# Patient Record
Sex: FEMALE | Race: WHITE | NOT HISPANIC OR LATINO | Employment: OTHER | URBAN - METROPOLITAN AREA
[De-identification: names, ages, dates, MRNs, and addresses within clinical notes are randomized per-mention and may not be internally consistent; named-entity substitution may affect disease eponyms.]

---

## 2020-12-31 ENCOUNTER — TELEMEDICINE (OUTPATIENT)
Dept: GASTROENTEROLOGY | Facility: CLINIC | Age: 75
End: 2020-12-31
Payer: MEDICARE

## 2020-12-31 VITALS — WEIGHT: 160 LBS | HEIGHT: 66 IN | BODY MASS INDEX: 25.71 KG/M2

## 2020-12-31 DIAGNOSIS — M35.01 SJOGREN'S SYNDROME WITH KERATOCONJUNCTIVITIS SICCA (HCC): ICD-10-CM

## 2020-12-31 DIAGNOSIS — K56.41 FECAL IMPACTION (HCC): ICD-10-CM

## 2020-12-31 DIAGNOSIS — F41.9 ANXIETY: ICD-10-CM

## 2020-12-31 DIAGNOSIS — K59.00 CONSTIPATION, UNSPECIFIED CONSTIPATION TYPE: Primary | ICD-10-CM

## 2020-12-31 PROBLEM — I10 ESSENTIAL HYPERTENSION: Status: ACTIVE | Noted: 2020-12-31

## 2020-12-31 PROCEDURE — 99204 OFFICE O/P NEW MOD 45 MIN: CPT | Performed by: INTERNAL MEDICINE

## 2020-12-31 RX ORDER — LISINOPRIL 40 MG/1
40 TABLET ORAL DAILY
COMMUNITY
Start: 2020-12-21

## 2020-12-31 RX ORDER — AMLODIPINE BESYLATE 5 MG/1
5 TABLET ORAL DAILY
COMMUNITY
Start: 2020-12-19

## 2020-12-31 RX ORDER — HYDROXYCHLOROQUINE SULFATE 200 MG/1
200 TABLET, FILM COATED ORAL DAILY
COMMUNITY
Start: 2020-11-21

## 2020-12-31 RX ORDER — LANOLIN ALCOHOL/MO/W.PET/CERES
CREAM (GRAM) TOPICAL DAILY
COMMUNITY

## 2020-12-31 RX ORDER — DIAZEPAM 5 MG/1
5 TABLET ORAL EVERY 6 HOURS PRN
COMMUNITY
Start: 2020-11-16

## 2020-12-31 RX ORDER — MELATONIN
1000 DAILY
COMMUNITY

## 2021-01-06 ENCOUNTER — TELEPHONE (OUTPATIENT)
Dept: GASTROENTEROLOGY | Facility: CLINIC | Age: 76
End: 2021-01-06

## 2021-01-06 NOTE — TELEPHONE ENCOUNTER
Called patient to schedule colonoscopy ordered at recent telemed visit  She was driving at the moment and will call me back when she gets home to her calendar

## 2021-01-27 ENCOUNTER — TELEMEDICINE (OUTPATIENT)
Dept: GASTROENTEROLOGY | Facility: CLINIC | Age: 76
End: 2021-01-27

## 2021-01-27 VITALS — WEIGHT: 160 LBS | HEIGHT: 66 IN | BODY MASS INDEX: 25.71 KG/M2

## 2021-01-27 DIAGNOSIS — K59.00 CONSTIPATION, UNSPECIFIED CONSTIPATION TYPE: Primary | ICD-10-CM

## 2021-01-27 RX ORDER — CHOLESTYRAMINE 4 G/9G
1 POWDER, FOR SUSPENSION ORAL DAILY
COMMUNITY
End: 2021-11-18

## 2021-01-27 NOTE — PROGRESS NOTES
Why does your doctor want you to have this procedure? Constipation    Do you have kidney disease?  no  If yes, are you on dialysis :     Have you had diverticulitis within the past 2 months? no    Are you diabetic?  no  If yes, insulin dependent: If yes, provide diabetic instructions sheet     Do take iron supplements?  no  If yes, instruct patient to hold iron supplement for 7 days prior    Are you on a blood thinner? no   Was the blood thinner sheet complete and faxed to cardiologist no  Plavix (clopidogrel), Coumadin (warfarin), Lovenox (enoxaparin), Xarelto (rivaroxaban), Pradaxa(dabigatran), Eliquis(apixaban) Savaysa/Lixiana (edoxapan)    Do you have an automatic implantable cardiac defibrillator (AICD)/pacemaker ([facility])? no  Was AICD/pacemaker sheet completed and faxed to cardiologist? no    Are you on home oxygen? no  If yes, continuous or nocturnal:     Have you been treated for MRSA, VRE or any communicable diseases? no    Heart attack, stroke, or stent within 3 months? no  Schedule at Hospital if within 3-6 months   Use nitroglycerin for chest pain in the last 6 months? no    History of organ  transplant?  no   If yes, notify Endo      History of neck/throat/tongue surgery or cancer? no  IF yes, notify Endo      Any problems with anesthesia in the past? no     Was stool C diff ordered?  no Stool specimen needs to be completed prior to procedure    Do have any facial or body piercings?no     Do you have a latex allergy? no     Do have an allergy to metals? (Bravo study only) no     If pediatric patient, was consent faxed to pediatrician no     Patient rights reviewed yes     Colon phone prep completed;  suprep instructions reviewed and emailed to pt; pts' constipation has improved; moves bowels almost daily; having softer stools more regularly; rx forwarded to provider

## 2021-02-03 ENCOUNTER — ANESTHESIA EVENT (OUTPATIENT)
Dept: GASTROENTEROLOGY | Facility: AMBULATORY SURGERY CENTER | Age: 76
End: 2021-02-03

## 2021-02-03 ENCOUNTER — HOSPITAL ENCOUNTER (OUTPATIENT)
Dept: GASTROENTEROLOGY | Facility: AMBULATORY SURGERY CENTER | Age: 76
Discharge: HOME | End: 2021-02-03
Payer: MEDICARE

## 2021-02-03 ENCOUNTER — ANESTHESIA (OUTPATIENT)
Dept: GASTROENTEROLOGY | Facility: AMBULATORY SURGERY CENTER | Age: 76
End: 2021-02-03

## 2021-02-03 VITALS
DIASTOLIC BLOOD PRESSURE: 80 MMHG | HEART RATE: 58 BPM | SYSTOLIC BLOOD PRESSURE: 170 MMHG | OXYGEN SATURATION: 99 % | TEMPERATURE: 97.7 F | RESPIRATION RATE: 20 BRPM

## 2021-02-03 VITALS — HEART RATE: 49 BPM

## 2021-02-03 DIAGNOSIS — K56.41 FECAL IMPACTION (HCC): ICD-10-CM

## 2021-02-03 DIAGNOSIS — K59.00 CONSTIPATION, UNSPECIFIED CONSTIPATION TYPE: ICD-10-CM

## 2021-02-03 PROCEDURE — 45378 DIAGNOSTIC COLONOSCOPY: CPT | Performed by: INTERNAL MEDICINE

## 2021-02-03 RX ORDER — GLYCOPYRROLATE 0.2 MG/ML
INJECTION INTRAMUSCULAR; INTRAVENOUS AS NEEDED
Status: DISCONTINUED | OUTPATIENT
Start: 2021-02-03 | End: 2021-02-03

## 2021-02-03 RX ORDER — PROPOFOL 10 MG/ML
INJECTION, EMULSION INTRAVENOUS AS NEEDED
Status: DISCONTINUED | OUTPATIENT
Start: 2021-02-03 | End: 2021-02-03

## 2021-02-03 RX ORDER — SODIUM CHLORIDE 9 MG/ML
50 INJECTION, SOLUTION INTRAVENOUS CONTINUOUS
Status: DISCONTINUED | OUTPATIENT
Start: 2021-02-03 | End: 2021-02-07 | Stop reason: HOSPADM

## 2021-02-03 RX ORDER — ONDANSETRON 2 MG/ML
INJECTION INTRAMUSCULAR; INTRAVENOUS AS NEEDED
Status: DISCONTINUED | OUTPATIENT
Start: 2021-02-03 | End: 2021-02-03

## 2021-02-03 RX ADMIN — PROPOFOL 20 MG: 10 INJECTION, EMULSION INTRAVENOUS at 11:38

## 2021-02-03 RX ADMIN — GLYCOPYRROLATE 0.2 MG: 0.2 INJECTION INTRAMUSCULAR; INTRAVENOUS at 11:34

## 2021-02-03 RX ADMIN — PROPOFOL 90 MG: 10 INJECTION, EMULSION INTRAVENOUS at 11:25

## 2021-02-03 RX ADMIN — ONDANSETRON 4 MG: 2 INJECTION INTRAMUSCULAR; INTRAVENOUS at 11:16

## 2021-02-03 RX ADMIN — PROPOFOL 50 MG: 10 INJECTION, EMULSION INTRAVENOUS at 11:28

## 2021-02-03 RX ADMIN — PROPOFOL 40 MG: 10 INJECTION, EMULSION INTRAVENOUS at 11:31

## 2021-02-03 RX ADMIN — PROPOFOL 50 MG: 10 INJECTION, EMULSION INTRAVENOUS at 11:41

## 2021-02-03 RX ADMIN — SODIUM CHLORIDE: 9 INJECTION, SOLUTION INTRAVENOUS at 11:11

## 2021-02-03 RX ADMIN — SODIUM CHLORIDE 50 ML/HR: 9 INJECTION, SOLUTION INTRAVENOUS at 11:14

## 2021-02-03 NOTE — DISCHARGE INSTRUCTIONS
Hemorrhoids   WHAT YOU NEED TO KNOW:   What are hemorrhoids? Hemorrhoids are swollen blood vessels inside your rectum (internal hemorrhoids) or on your anus (external hemorrhoids)  Sometimes a hemorrhoid may prolapse  This means it extends out of your anus  What increases my risk for hemorrhoids? · Pregnancy or obesity    · Straining or sitting for a long time during bowel movements    · Liver disease    · Weak muscles around the anus caused by older age, rectal surgery, or anal intercourse    · A lack of physical activity    · Chronic diarrhea or constipation    · A low-fiber diet    What are the signs and symptoms of hemorrhoids? · Pain or itching around your anus or inside your rectum    · Swelling or bumps around your anus    · Bright red blood in your bowel movement, on the toilet paper, or in the toilet bowl    · Tissue bulging out of your anus (prolapsed hemorrhoids)    · Incontinence (poor control over urine or bowel movements)    How are hemorrhoids diagnosed? Your healthcare provider will ask about your symptoms, the foods you eat, and your bowel movements  He or she will examine your anus for external hemorrhoids  You may need the following:  · A digital rectal exam  is a test to check for hemorrhoids  Your healthcare provider will put a gloved finger inside your anus to feel for the hemorrhoids  · An anoscopy  is a test that uses a scope (small tube with a light and camera on the end) to look at your hemorrhoids  How are hemorrhoids treated? Treatment will depend on your symptoms  You may need any of the following:  · Medicines  can help decrease pain and swelling, and soften your bowel movement  The medicine may be a pill, pad, cream, or ointment  · Procedures  may be used to shrink or remove your hemorrhoid  Examples include rubber-band ligation, sclerotherapy, and photocoagulation  These procedures may be done in your healthcare provider's office   Ask your healthcare provider for more information about these procedures  · Surgery  may be needed to shrink or remove your hemorrhoids  How can I manage my symptoms? · Apply ice on your anus for 15 to 20 minutes every hour or as directed  Use an ice pack, or put crushed ice in a plastic bag  Cover it with a towel before you apply it to your anus  Ice helps prevent tissue damage and decreases swelling and pain  · Take a sitz bath  Fill a bathtub with 4 to 6 inches of warm water  You may also use a sitz bath pan that fits inside a toilet bowl  Sit in the sitz bath for 15 minutes  Do this 3 times a day, and after each bowel movement  The warm water can help decrease pain and swelling  · Keep your anal area clean  Gently wash the area with warm water daily  Soap may irritate the area  After a bowel movement, wipe with moist towelettes or wet toilet paper  Dry toilet paper can irritate the area  How can I help prevent hemorrhoids? · Do not strain to have a bowel movement  Do not sit on the toilet too long  These actions can increase pressure on the tissues in your rectum and anus  · Drink plenty of liquids  Liquids can help prevent constipation  Ask how much liquid to drink each day and which liquids are best for you  · Eat a variety of high-fiber foods  Examples include fruits, vegetables, and whole grains  Ask your healthcare provider how much fiber you need each day  You may need to take a fiber supplement  · Exercise as directed  Exercise, such as walking, may make it easier to have a bowel movement  Ask your healthcare provider to help you create an exercise plan  · Do not have anal sex  Anal sex can weaken the skin around your rectum and anus  · Avoid heavy lifting  This can cause straining and increase your risk for another hemorrhoid  When should I seek immediate care? · You have severe pain in your rectum or around your anus      · You have severe pain in your abdomen and you are vomiting  · You have bleeding from your anus that soaks through your underwear  When should I contact my healthcare provider? · You have frequent and painful bowel movements  · Your hemorrhoid looks or feels more swollen than usual      · You do not have a bowel movement for 2 days or more  · You see or feel tissue coming through your anus  · You have questions or concerns about your condition or care  CARE AGREEMENT:   You have the right to help plan your care  Learn about your health condition and how it may be treated  Discuss treatment options with your healthcare providers to decide what care you want to receive  You always have the right to refuse treatment  The above information is an  only  It is not intended as medical advice for individual conditions or treatments  Talk to your doctor, nurse or pharmacist before following any medical regimen to see if it is safe and effective for you  © Copyright [address] Information is for End User's use only and may not be sold, redistributed or otherwise used for commercial purposes   All illustrations and images included in CareNotes® are the copyrighted property of A D A M , Inc  or [address] [place] [address]

## 2021-02-03 NOTE — H&P
History and Physical -  [facility]  Whitney Gardiner 70 y o  female MRN: [id number]    HPI: Whitney Gardiner is a 70y o  year old female who presents for diagnostic colonoscopy  Patient has had a recent alteration in her bowel habit    REVIEW OF SYSTEMS: Per the HPI, and otherwise unremarkable  Historical Information   Past Medical History:   Diagnosis Date    [facility]     Hypertension     Sjogren's disease [facility]     UTI (urinary tract infection)      Past Surgical History:   Procedure Laterality Date    BREAST LUMPECTOMY      CHOLECYSTECTOMY      REPLACEMENT TOTAL KNEE       Social History   Social History     Substance and Sexual Activity   Alcohol Use Not Currently     Social History     Substance and Sexual Activity   Drug Use Never     Social History     Tobacco Use   Smoking Status Never Smoker   Smokeless Tobacco Never Used     Family History   Problem Relation Age of Onset    COPD Mother     Depression Mother     Stroke Father     Ovarian cancer Sister     Colon cancer Neg Hx     Colon polyps Neg Hx        Meds/Allergies       Current Outpatient Medications:     amLODIPine (NORVASC) 5 mg tablet    Calcium Carbonate-Vit D-Min (CALCIUM 1200 PO)    cholecalciferol (VITAMIN D3) 1,000 units tablet    cholestyramine (Questran) 4 g packet    diazepam (VALIUM) 5 mg tablet    hydroxychloroquine (PLAQUENIL) 200 mg tablet    lisinopril (ZESTRIL) 20 mg tablet    Na Sulfate-K Sulfate-Mg Sulf 17 5-3 13-1 6 GM/177ML SOLN    vitamin B-12 (VITAMIN B-12) 1,000 mcg tablet    Allergies   Allergen Reactions    Penicillins        Objective     There were no vitals taken for this visit  PHYSICAL EXAM    Gen: NAD AAOx3  CV: S1S2 RRR no m/r/g  CHEST: Clear b/l no c/r/w  ABD: soft, +BS NT/ND  EXT: no edema    ASSESSMENT/PLAN:  This is a 70y o  year old female here for diagnostic colonoscopy, and she is stable and optimized for her procedure

## 2021-03-30 DIAGNOSIS — Z23 ENCOUNTER FOR IMMUNIZATION: ICD-10-CM

## 2021-11-18 ENCOUNTER — OFFICE VISIT (OUTPATIENT)
Dept: GASTROENTEROLOGY | Facility: CLINIC | Age: 76
End: 2021-11-18
Payer: MEDICARE

## 2021-11-18 VITALS
HEIGHT: 66 IN | BODY MASS INDEX: 26.2 KG/M2 | WEIGHT: 163 LBS | HEART RATE: 64 BPM | SYSTOLIC BLOOD PRESSURE: 138 MMHG | DIASTOLIC BLOOD PRESSURE: 86 MMHG

## 2021-11-18 DIAGNOSIS — R13.19 ESOPHAGEAL DYSPHAGIA: Primary | ICD-10-CM

## 2021-11-18 DIAGNOSIS — M35.01 SJOGREN'S SYNDROME WITH KERATOCONJUNCTIVITIS SICCA (HCC): ICD-10-CM

## 2021-11-18 DIAGNOSIS — Z12.11 COLON CANCER SCREENING: ICD-10-CM

## 2021-11-18 DIAGNOSIS — K59.09 OTHER CONSTIPATION: ICD-10-CM

## 2021-11-18 PROCEDURE — 99214 OFFICE O/P EST MOD 30 MIN: CPT | Performed by: INTERNAL MEDICINE

## 2021-11-18 PROCEDURE — 1123F ACP DISCUSS/DSCN MKR DOCD: CPT | Performed by: INTERNAL MEDICINE

## 2021-12-21 ENCOUNTER — APPOINTMENT (EMERGENCY)
Dept: CT IMAGING | Facility: HOSPITAL | Age: 76
End: 2021-12-21
Payer: MEDICARE

## 2021-12-21 ENCOUNTER — ANESTHESIA EVENT (OUTPATIENT)
Dept: GASTROENTEROLOGY | Facility: AMBULATORY SURGERY CENTER | Age: 76
End: 2021-12-21

## 2021-12-21 ENCOUNTER — HOSPITAL ENCOUNTER (OUTPATIENT)
Dept: GASTROENTEROLOGY | Facility: AMBULATORY SURGERY CENTER | Age: 76
Discharge: HOME | End: 2021-12-21
Payer: MEDICARE

## 2021-12-21 ENCOUNTER — ANESTHESIA (OUTPATIENT)
Dept: GASTROENTEROLOGY | Facility: AMBULATORY SURGERY CENTER | Age: 76
End: 2021-12-21

## 2021-12-21 ENCOUNTER — HOSPITAL ENCOUNTER (EMERGENCY)
Facility: HOSPITAL | Age: 76
Discharge: HOME | End: 2021-12-21
Attending: EMERGENCY MEDICINE | Admitting: EMERGENCY MEDICINE
Payer: MEDICARE

## 2021-12-21 VITALS
OXYGEN SATURATION: 99 % | RESPIRATION RATE: 16 BRPM | TEMPERATURE: 97.2 F | BODY MASS INDEX: 26.7 KG/M2 | SYSTOLIC BLOOD PRESSURE: 194 MMHG | HEART RATE: 56 BPM | WEIGHT: 162.92 LBS | DIASTOLIC BLOOD PRESSURE: 78 MMHG

## 2021-12-21 VITALS
TEMPERATURE: 97.4 F | DIASTOLIC BLOOD PRESSURE: 88 MMHG | OXYGEN SATURATION: 99 % | SYSTOLIC BLOOD PRESSURE: 119 MMHG | HEART RATE: 71 BPM | RESPIRATION RATE: 21 BRPM

## 2021-12-21 DIAGNOSIS — R13.19 ESOPHAGEAL DYSPHAGIA: ICD-10-CM

## 2021-12-21 DIAGNOSIS — R13.10 DYSPHAGIA: Primary | ICD-10-CM

## 2021-12-21 PROBLEM — I35.0 AORTIC STENOSIS: Status: ACTIVE | Noted: 2021-12-21

## 2021-12-21 LAB
ALBUMIN SERPL BCP-MCNC: 4.1 G/DL (ref 3.5–5)
ALP SERPL-CCNC: 71 U/L (ref 46–116)
ALT SERPL W P-5'-P-CCNC: 17 U/L (ref 12–78)
ANION GAP SERPL CALCULATED.3IONS-SCNC: 9 MMOL/L (ref 4–13)
AST SERPL W P-5'-P-CCNC: 20 U/L (ref 5–45)
BILIRUB SERPL-MCNC: 1.1 MG/DL (ref 0.2–1)
BUN SERPL-MCNC: 18 MG/DL (ref 5–25)
CALCIUM SERPL-MCNC: 8.9 MG/DL (ref 8.3–10.1)
CHLORIDE SERPL-SCNC: 106 MMOL/L (ref 100–108)
CO2 SERPL-SCNC: 25 MMOL/L (ref 21–32)
CREAT SERPL-MCNC: 0.84 MG/DL (ref 0.6–1.3)
GFR SERPL CREATININE-BSD FRML MDRD: 68 ML/MIN/1.73SQ M
GLUCOSE SERPL-MCNC: 92 MG/DL (ref 65–140)
POTASSIUM SERPL-SCNC: 4 MMOL/L (ref 3.5–5.3)
PROT SERPL-MCNC: 7.3 G/DL (ref 6.4–8.2)
SODIUM SERPL-SCNC: 140 MMOL/L (ref 136–145)

## 2021-12-21 PROCEDURE — 96360 HYDRATION IV INFUSION INIT: CPT

## 2021-12-21 PROCEDURE — 99283 EMERGENCY DEPT VISIT LOW MDM: CPT

## 2021-12-21 PROCEDURE — 43239 EGD BIOPSY SINGLE/MULTIPLE: CPT | Performed by: INTERNAL MEDICINE

## 2021-12-21 PROCEDURE — 43450 DILATE ESOPHAGUS 1/MULT PASS: CPT | Performed by: INTERNAL MEDICINE

## 2021-12-21 PROCEDURE — G1004 CDSM NDSC: HCPCS

## 2021-12-21 PROCEDURE — 99284 EMERGENCY DEPT VISIT MOD MDM: CPT | Performed by: EMERGENCY MEDICINE

## 2021-12-21 PROCEDURE — 88305 TISSUE EXAM BY PATHOLOGIST: CPT | Performed by: SPECIALIST

## 2021-12-21 PROCEDURE — 80053 COMPREHEN METABOLIC PANEL: CPT | Performed by: EMERGENCY MEDICINE

## 2021-12-21 PROCEDURE — 70491 CT SOFT TISSUE NECK W/DYE: CPT

## 2021-12-21 PROCEDURE — 71260 CT THORAX DX C+: CPT

## 2021-12-21 PROCEDURE — 36415 COLL VENOUS BLD VENIPUNCTURE: CPT | Performed by: EMERGENCY MEDICINE

## 2021-12-21 RX ORDER — LIDOCAINE HYDROCHLORIDE 10 MG/ML
0.5 INJECTION, SOLUTION EPIDURAL; INFILTRATION; INTRACAUDAL; PERINEURAL ONCE AS NEEDED
Status: DISCONTINUED | OUTPATIENT
Start: 2021-12-21 | End: 2021-12-25 | Stop reason: HOSPADM

## 2021-12-21 RX ORDER — LIDOCAINE HYDROCHLORIDE 10 MG/ML
INJECTION, SOLUTION EPIDURAL; INFILTRATION; INTRACAUDAL; PERINEURAL AS NEEDED
Status: DISCONTINUED | OUTPATIENT
Start: 2021-12-21 | End: 2021-12-21

## 2021-12-21 RX ORDER — GLYCOPYRROLATE 0.2 MG/ML
INJECTION INTRAMUSCULAR; INTRAVENOUS AS NEEDED
Status: DISCONTINUED | OUTPATIENT
Start: 2021-12-21 | End: 2021-12-21

## 2021-12-21 RX ORDER — PROPOFOL 10 MG/ML
INJECTION, EMULSION INTRAVENOUS AS NEEDED
Status: DISCONTINUED | OUTPATIENT
Start: 2021-12-21 | End: 2021-12-21

## 2021-12-21 RX ORDER — SODIUM CHLORIDE, SODIUM LACTATE, POTASSIUM CHLORIDE, CALCIUM CHLORIDE 600; 310; 30; 20 MG/100ML; MG/100ML; MG/100ML; MG/100ML
125 INJECTION, SOLUTION INTRAVENOUS CONTINUOUS
Status: DISCONTINUED | OUTPATIENT
Start: 2021-12-21 | End: 2021-12-25 | Stop reason: HOSPADM

## 2021-12-21 RX ORDER — HYDRALAZINE HYDROCHLORIDE 20 MG/ML
INJECTION INTRAMUSCULAR; INTRAVENOUS AS NEEDED
Status: DISCONTINUED | OUTPATIENT
Start: 2021-12-21 | End: 2021-12-21

## 2021-12-21 RX ORDER — SODIUM CHLORIDE 9 MG/ML
125 INJECTION, SOLUTION INTRAVENOUS CONTINUOUS
Status: DISCONTINUED | OUTPATIENT
Start: 2021-12-21 | End: 2021-12-21

## 2021-12-21 RX ADMIN — GLYCOPYRROLATE 0.1 MG: 0.2 INJECTION INTRAMUSCULAR; INTRAVENOUS at 11:17

## 2021-12-21 RX ADMIN — PROPOFOL 70 MG: 10 INJECTION, EMULSION INTRAVENOUS at 11:39

## 2021-12-21 RX ADMIN — IOHEXOL 100 ML: 350 INJECTION, SOLUTION INTRAVENOUS at 15:39

## 2021-12-21 RX ADMIN — PROPOFOL 20 MG: 10 INJECTION, EMULSION INTRAVENOUS at 11:48

## 2021-12-21 RX ADMIN — SODIUM CHLORIDE 500 ML: 0.9 INJECTION, SOLUTION INTRAVENOUS at 15:00

## 2021-12-21 RX ADMIN — PROPOFOL 20 MG: 10 INJECTION, EMULSION INTRAVENOUS at 11:43

## 2021-12-21 RX ADMIN — HYDRALAZINE HYDROCHLORIDE 5 MG: 20 INJECTION INTRAMUSCULAR; INTRAVENOUS at 12:04

## 2021-12-21 RX ADMIN — SODIUM CHLORIDE, SODIUM LACTATE, POTASSIUM CHLORIDE, CALCIUM CHLORIDE 125 ML/HR: 600; 310; 30; 20 INJECTION, SOLUTION INTRAVENOUS at 10:55

## 2021-12-21 RX ADMIN — LIDOCAINE HYDROCHLORIDE 70 MG: 10 INJECTION, SOLUTION EPIDURAL; INFILTRATION; INTRACAUDAL; PERINEURAL at 11:39

## 2022-02-16 ENCOUNTER — OFFICE VISIT (OUTPATIENT)
Dept: GASTROENTEROLOGY | Facility: CLINIC | Age: 77
End: 2022-02-16
Payer: MEDICARE

## 2022-02-16 VITALS
HEIGHT: 66 IN | DIASTOLIC BLOOD PRESSURE: 96 MMHG | BODY MASS INDEX: 27.8 KG/M2 | WEIGHT: 173 LBS | SYSTOLIC BLOOD PRESSURE: 158 MMHG

## 2022-02-16 DIAGNOSIS — Z12.11 COLON CANCER SCREENING: ICD-10-CM

## 2022-02-16 DIAGNOSIS — R19.7 DIARRHEA, UNSPECIFIED TYPE: ICD-10-CM

## 2022-02-16 DIAGNOSIS — R13.19 ESOPHAGEAL DYSPHAGIA: Primary | ICD-10-CM

## 2022-02-16 DIAGNOSIS — K22.70 BARRETT'S ESOPHAGUS WITHOUT DYSPLASIA: ICD-10-CM

## 2022-02-16 DIAGNOSIS — M35.01 SJOGREN'S SYNDROME WITH KERATOCONJUNCTIVITIS SICCA (HCC): ICD-10-CM

## 2022-02-16 PROCEDURE — 99214 OFFICE O/P EST MOD 30 MIN: CPT | Performed by: INTERNAL MEDICINE

## 2022-02-16 RX ORDER — CHOLESTYRAMINE 4 G/9G
POWDER, FOR SUSPENSION ORAL
COMMUNITY
Start: 2013-09-01

## 2022-02-16 NOTE — PATIENT INSTRUCTIONS
[address] [facility] - Outpatient Follow-up Note  Jovanny Garcia 78 y o  female MRN: [id number]  Encounter: [id number]    ASSESSMENT AND PLAN:      1  Esophageal dysphagia    --Patient with longstanding dysphagia for solids and pills  Happily did not have occur all that frequently but seemed to be escalating  Patient did have upper endoscopy in  December of 2021  Did have some subjective tightness in the proximal esophagus without clear-cut stricture but had empiric dilation  Developed some chest discomfort but negative ER workup for perforation   -  Since esophageal dilation doing better  Less concern with swallowing breads and solids  May have had 1 episode where she felt a pill "temporarily get stuck"    - continue to observe at this time  Will hold on barium swallow  If symptoms escalate would obtain a barium swallow    2  Sjogren's syndrome with keratoconjunctivitis sicca ([facility] )    - likely contributing to problem 1  Encouraged to take plenty of fluids with food  3  Diarrhea, unspecified type  ---  Patient has had issues with loose stool since 2012  Has been on Questran with excellent results  -  Continues on Questran 1 pack per day  Very good results    4  Colon cancer screening  - last colonoscopy was January of last year 2021-- no colorectal polyps at that time or previous history  No further screening needed  5  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

## 2022-02-16 NOTE — LETTER
February 17, 2022     Walter Steve MD  [facility]    Patient: Roberta Jmaa   YOB: 1945   Date of Visit: 2/16/2022       Dear Dr Matt Alonso: Thank you for referring Roberta Jama to me for evaluation  Below are my notes for this consultation  If you have questions, please do not hesitate to call me  I look forward to following your patient along with you  Sincerely,        Amrit Snell MD        CC: No Recipients  Amrit Snell MD  2/17/2022  1:07 AM  Sign when Signing Visit  [address] [facility] - Outpatient Follow-up Note  Roberta Jama 78 y o  female MRN: [id number]  Encounter: [id number]    ASSESSMENT AND PLAN:      1  Esophageal dysphagia    --Patient with longstanding dysphagia for solids and pills  Happily did not have occur all that frequently but seemed to be escalating  Patient did have upper endoscopy in  December of 2021  Did have some subjective tightness in the proximal esophagus without clear-cut stricture but had empiric dilation  Developed some chest discomfort but negative ER workup for perforation   -  Since esophageal dilation doing better  Less concern with swallowing breads and solids  May have had 1 episode where she felt a pill "temporarily get stuck"    - continue to observe at this time  Will hold on barium swallow  If symptoms escalate would obtain a barium swallow    2  Sjogren's syndrome with keratoconjunctivitis sicca ([facility] [address] )    - likely contributing to problem 1  Encouraged to take plenty of fluids with food  3  Diarrhea, unspecified type  ---  Patient has had issues with loose stool since 2012  Has been on Questran with excellent results  -  Continues on Questran 1 pack per day  Very good results    4  Colon cancer screening  - last colonoscopy was January of last year 2021-- no colorectal polyps at that time or previous history  No further screening needed        5  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 and irregular Z-line on endoscopy biopsies were taken to rule out eosinophilic esophagitis  Did not have high suspicious for Mehta's  Biopsies did show intestinal metaplasia  - Very  Low risk for advanced neoplasm  Situation probably borderline at best  - does not have frequent heartburn so will hold on PPI dose  Assess in visit next year whether we should consider repeat endoscopy at the 3 year barb  Followup Appointment: 1 year   ______________________________________________________________________    Chief Complaint   Patient presents with    Follow-up     feeling better     HPI:  Patient returns to the office after recent endoscopic procedure in December for evaluation of esophageal dysphagia  Patient has had some problems over the years with swallowing pills dry bread and pretzels  She has never really had long-term symptoms of esophageal obstruction  Symptoms seem to be escalating in the last year so  Patient's symptoms my of her nearly on a monthly basis  She was seen in the office in November and had her upper endoscopy in December   Remarkable in the patient's medical history is that she has Sjogren syndrome with dry eyes and dry mouth  Patient does try to drink fluids while she is eating and generally eats slowly  A few weeks prior to her evaluation in the office late fall she had some choking sensation on a a dose of Tylenol  She also had some major problems which she thought about driving herself to the ED after eating a pretzel  In any event upper endoscopy was performed and no clear stricture or web was noted there is active sense of tightness on passing the endoscope through the upper esophagus  Patient did have [employer] dilators passed  Subsequently she developed some neck and upper chest pain  She was referred to [facility] ED  CT scan was performed which showed no evidence perforation    Within several days patient's symptoms have improved  Patient reports presently she has less trouble with swallowing  She has lost conscious of a day any difficulties with eating  Perhaps she had on 1 occasion a little bit of trouble swallowing a pill  She denies any major issues with heartburn  Reviewed results of her endoscopic biopsies  There was some irregularity of EG junction  Biopsies were taken and this was revealing for intestinal metaplasia consistent with 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  Other GI issues include a history of polyps cholecystectomy diarrhea  Patient had been treated Questran taking a packet twice a day with good results  Little over year ago she had problems with constipation and actually had a fecal impaction  She reduced her Questran dose to once a day and is moving bowels regularly  Went when she tried to decrease her Questran previously this resulted in recurrent diarrhea which he might keeping up       Historical Information   Past Medical History:   Diagnosis Date    [facility] cancer ([facility] )     right    [facility]     right    Hypertension     Sjogren's disease ([facility] )     UTI (urinary tract infection)      Past Surgical History:   Procedure Laterality Date    BREAST LUMPECTOMY Right     CHOLECYSTECTOMY      COLONOSCOPY      JOINT REPLACEMENT Bilateral     knees    REPLACEMENT TOTAL KNEE Bilateral      Social History     Substance and Sexual Activity   Alcohol Use Not Currently     Social History     Substance and Sexual Activity   Drug Use Never     Social History     Tobacco Use   Smoking Status Never Smoker   Smokeless Tobacco Never Used     Family History   Problem Relation Age of Onset    COPD Mother     Depression Mother     Stroke Father     Ovarian cancer Sister     Colon cancer Neg Hx     Colon polyps Neg Hx          Current Outpatient Medications:     amLODIPine (NORVASC) 5 mg tablet    Calcium-Cholecalciferol 500-200 MG-UNIT TABS    cholecalciferol (VITAMIN D3) 1,000 units tablet    cholestyramine (QUESTRAN) 4 g packet    diazepam (VALIUM) 5 mg tablet    hydroxychloroquine (PLAQUENIL) 200 mg tablet    lisinopril (ZESTRIL) 40 mg tablet    vitamin B-12 (VITAMIN B-12) 1,000 mcg tablet  Allergies   Allergen Reactions    Nitrofurantoin Fatigue     Pt  Unsure of reaction  Her primary has listed as an allergy  Other reaction(s): Unknown      Penicillins      Reviewed medications and allergies and updated as indicated    PHYSICAL EXAM:    Blood pressure 158/96, height 5' 5 5" (1 664 m), weight 78 5 kg (173 lb)  Body mass index is 28 35 kg/m²  General Appearance: NAD, cooperative, alert  Eyes: Anicteric, conjunctiva pink  ENT:  Normocephalic, atraumatic, normal mucosa  Neck:  Supple, symmetrical, trachea midline  Resp:  Clear to auscultation bilaterally; no rales, rhonchi or wheezing; respirations unlabored   CV:  S1 S2, Regular rate and rhythm; no murmur, rub, or gallop  GI:  Soft, non-tender, non-distended; normal bowel sounds; no masses, no organomegaly   Rectal: Deferred  Musculoskeletal: No cyanosis, clubbing , positive for trace edema Normal ROM  Skin:  No jaundice, rashes, or lesions   Heme/Lymph: No palpable cervical lymphadenopathy  Psych: Normal affect, good eye contact  Neuro: No gross deficits, AAOx3    Lab Results:   No results found for: WBC, HGB, HCT, MCV, PLT  Lab Results   Component Value Date    K 4 0 12/21/2021     12/21/2021    CO2 25 12/21/2021    BUN 18 12/21/2021    CREATININE 0 84 12/21/2021    CALCIUM 8 9 12/21/2021    AST 20 12/21/2021    ALT 17 12/21/2021    ALKPHOS 71 12/21/2021    EGFR 68 12/21/2021     CT of the chest and neck--air-filled esophagus without evidence of mediastinal air or pneumoperitoneum    -December 21, 2021 [address] [place] [address]

## 2022-02-16 NOTE — PROGRESS NOTES
[facility] - Outpatient Follow-up Note  Fred Junior 78 y o  female MRN: [id number]  Encounter: [id number]    ASSESSMENT AND PLAN:      1  Esophageal dysphagia    --Patient with longstanding dysphagia for solids and pills  Happily did not have occur all that frequently but seemed to be escalating  Patient did have upper endoscopy in  December of 2021  Did have some subjective tightness in the proximal esophagus without clear-cut stricture but had empiric dilation  Developed some chest discomfort but negative ER workup for perforation   -  Since esophageal dilation doing better  Less concern with swallowing breads and solids  May have had 1 episode where she felt a pill "temporarily get stuck"    - continue to observe at this time  Will hold on barium swallow  If symptoms escalate would obtain a barium swallow    2  Sjogren's syndrome with keratoconjunctivitis sicca ([facility] [address] )    - likely contributing to problem 1  Encouraged to take plenty of fluids with food  3  Diarrhea, unspecified type  ---  Patient has had issues with loose stool since 2012  Has been on Questran with excellent results  -  Continues on Questran 1 pack per day  Very good results    4  Colon cancer screening  - last colonoscopy was January of last year 2021-- no colorectal polyps at that time or previous history  No further screening needed  5  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 ______________________________________________________________________    Chief Complaint   Patient presents with    Follow-up     feeling better     HPI:  Patient returns to the office after recent endoscopic procedure in December for evaluation of esophageal dysphagia  Patient has had some problems over the years with swallowing pills dry bread and pretzels  She has never really had long-term symptoms of esophageal obstruction  Symptoms seem to be escalating in the last year so  Patient's symptoms my of her nearly on a monthly basis  She was seen in the office in November and had her upper endoscopy in December   Remarkable in the patient's medical history is that she has Sjogren syndrome with dry eyes and dry mouth  Patient does try to drink fluids while she is eating and generally eats slowly  A few weeks prior to her evaluation in the office late fall she had some choking sensation on a a dose of Tylenol  She also had some major problems which she thought about driving herself to the ED after eating a pretzel  In any event upper endoscopy was performed and no clear stricture or web was noted there is active sense of tightness on passing the endoscope through the upper esophagus  Patient did have large [employer] dilators passed  Subsequently she developed some neck and upper chest pain  She was referred to [facility] ED  CT scan was performed which showed no evidence perforation  Within several days patient's symptoms have improved  Patient reports presently she has less trouble with swallowing  She has lost conscious of a day any difficulties with eating  Perhaps she had on 1 occasion a little bit of trouble swallowing a pill  She denies any major issues with heartburn  Reviewed results of her endoscopic biopsies  There was some irregularity of EG junction    Biopsies were taken and this was revealing for intestinal metaplasia consistent with 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  Other GI issues include a history of polyps cholecystectomy diarrhea  Patient had been treated Questran taking a packet twice a day with good results  Little over year ago she had problems with constipation and actually had a fecal impaction  She reduced her Questran dose to once a day and is moving bowels regularly  Went when she tried to decrease her Questran previously this resulted in recurrent diarrhea which he might keeping up  Historical Information   Past Medical History:   Diagnosis Date    [facility] ([facility] [address] )     right    [facility]     [facility]    Hypertension     [facility]     UTI (urinary tract infection)      Past Surgical History:   Procedure Laterality Date    BREAST LUMPECTOMY Right     CHOLECYSTECTOMY      COLONOSCOPY      JOINT REPLACEMENT Bilateral     knees    REPLACEMENT TOTAL KNEE Bilateral      Social History     Substance and Sexual Activity   Alcohol Use Not Currently     Social History     Substance and Sexual Activity   Drug Use Never     Social History     Tobacco Use   Smoking Status Never Smoker   Smokeless Tobacco Never Used     Family History   Problem Relation Age of Onset    COPD Mother     Depression Mother     Stroke Father     Ovarian cancer Sister     Colon cancer Neg Hx     Colon polyps Neg Hx          Current Outpatient Medications:     amLODIPine (NORVASC) 5 mg tablet    Calcium-Cholecalciferol 500-200 MG-UNIT TABS    cholecalciferol (VITAMIN D3) 1,000 units tablet    cholestyramine (QUESTRAN) 4 g packet    diazepam (VALIUM) 5 mg tablet    hydroxychloroquine (PLAQUENIL) 200 mg tablet    lisinopril (ZESTRIL) 40 mg tablet    vitamin B-12 (VITAMIN B-12) 1,000 mcg tablet  Allergies   Allergen Reactions    Nitrofurantoin Fatigue     Pt  Unsure of reaction   Her primary has listed as an allergy  Other reaction(s): Unknown      Penicillins      Reviewed medications and allergies and updated as indicated    PHYSICAL EXAM: Blood pressure 158/96, height 5' 5 5" (1 664 m), weight 78 5 kg (173 lb)  Body mass index is 28 35 kg/m²  General Appearance: NAD, cooperative, alert  Eyes: Anicteric, conjunctiva pink  ENT:  Normocephalic, atraumatic, normal mucosa  Neck:  Supple, symmetrical, trachea midline  Resp:  Clear to auscultation bilaterally; no rales, rhonchi or wheezing; respirations unlabored   CV:  S1 S2, Regular rate and rhythm; no murmur, rub, or gallop  GI:  Soft, non-tender, non-distended; normal bowel sounds; no masses, no organomegaly   Rectal: Deferred  Musculoskeletal: No cyanosis, clubbing , positive for trace edema Normal ROM  Skin:  No jaundice, rashes, or lesions   Heme/Lymph: No palpable cervical lymphadenopathy  Psych: Normal affect, good eye contact  Neuro: No gross deficits, AAOx3    Lab Results:   No results found for: WBC, HGB, HCT, MCV, PLT  Lab Results   Component Value Date    K 4 0 12/21/2021     12/21/2021    CO2 25 12/21/2021    BUN 18 12/21/2021    CREATININE 0 84 12/21/2021    CALCIUM 8 9 12/21/2021    AST 20 12/21/2021    ALT 17 12/21/2021    ALKPHOS 71 12/21/2021    EGFR 68 12/21/2021     CT of the chest and neck--air-filled esophagus without evidence of mediastinal air or pneumoperitoneum    -December 21, 2021 [facility]

## 2022-02-16 NOTE — LETTER
February 17, 2022     Eliza Fajardo MD  [facility]    Patient: Humera Tarango   YOB: 1945   Date of Visit: 2/16/2022       Dear Dr Mima Rees: Thank you for referring Humera Tarango to me for evaluation  Below are my notes for this consultation  If you have questions, please do not hesitate to call me  I look forward to following your patient along with you  Sincerely,        Ryan Joel MD        CC: MD Ryan Giron MD  2/17/2022  1:04 AM  Incomplete  [facility] - Outpatient Follow-up Note  Humera Tarango 78 y o  female MRN: [id number]  Encounter: [id number]    ASSESSMENT AND PLAN:      1  Esophageal dysphagia    --Patient with longstanding dysphagia for solids and pills  Happily did not have occur all that frequently but seemed to be escalating  Patient did have upper endoscopy in  December of 2021  Did have some subjective tightness in the proximal esophagus without clear-cut stricture but had empiric dilation  Developed some chest discomfort but negative ER workup for perforation   -  Since esophageal dilation doing better  Less concern with swallowing breads and solids  May have had 1 episode where she felt a pill "temporarily get stuck"    - continue to observe at this time  Will hold on barium swallow  If symptoms escalate would obtain a barium swallow    2  Sjogren's syndrome with keratoconjunctivitis sicca ([facility] [address] )    - likely contributing to problem 1  Encouraged to take plenty of fluids with food  3  Diarrhea, unspecified type  ---  Patient has had issues with loose stool since 2012  Has been on Questran with excellent results  -  Continues on Questran 1 pack per day  Very good results    4  Colon cancer screening  - last colonoscopy was January of last year 2021-- no colorectal polyps at that time or previous history  No further screening needed        5  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 and irregular Z-line on endoscopy biopsies were taken to rule out eosinophilic esophagitis  Did not have high suspicious for Mehta's  Biopsies did show intestinal metaplasia  - Very  Low risk for advanced neoplasm  Situation probably borderline at best  - does not have frequent heartburn so will hold on PPI dose  Assess in visit next year whether we should consider repeat endoscopy at the 3 year barb  Followup Appointment: 1 year   ______________________________________________________________________    Chief Complaint   Patient presents with    Follow-up     feeling better     HPI:  Patient returns to the office after recent endoscopic procedure in December for evaluation of esophageal dysphagia  Patient has had some problems over the years with swallowing pills dry bread and pretzels  She has never really had long-term symptoms of esophageal obstruction  Symptoms seem to be escalating in the last year so  Patient's symptoms my of her nearly on a monthly basis  She was seen in the office in November and had her upper endoscopy in December   Remarkable in the patient's medical history is that she has Sjogren syndrome with dry eyes and dry mouth  Patient does try to drink fluids while she is eating and generally eats slowly  A few weeks prior to her evaluation in the office late fall she had some choking sensation on a a dose of Tylenol  She also had some major problems which she thought about driving herself to the ED after eating a pretzel  In any event upper endoscopy was performed and no clear stricture or web was noted there is active sense of tightness on passing the endoscope through the upper esophagus  Patient did have Leot[place] dilators passed  Subsequently she developed some neck and upper chest pain  She was referred to [facility] ED  CT scan was performed which showed no evidence perforation    Within several days patient's symptoms have improved  Patient reports presently she has less trouble with swallowing  She has lost conscious of a day any difficulties with eating  Perhaps she had on 1 occasion a little bit of trouble swallowing a pill  She denies any major issues with heartburn  Reviewed results of her endoscopic biopsies  There was some irregularity of EG junction  Biopsies were taken and this was revealing for intestinal metaplasia consistent with 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  Other GI issues include a history of polyps cholecystectomy diarrhea  Patient had been treated Questran taking a packet twice a day with good results  Little over year ago she had problems with constipation and actually had a fecal impaction  She reduced her Questran dose to once a day and is moving bowels regularly  Went when she tried to decrease her Questran previously this resulted in recurrent diarrhea which he might keeping up       Historical Information   Past Medical History:   Diagnosis Date    [facility] ([facility] )     right    [facility]     [facility]    Hypertension     Sjogren's disease ([facility] )     UTI (urinary tract infection)      Past Surgical History:   Procedure Laterality Date    BREAST LUMPECTOMY Right     CHOLECYSTECTOMY      COLONOSCOPY      JOINT REPLACEMENT Bilateral     knees    REPLACEMENT TOTAL KNEE Bilateral      Social History     Substance and Sexual Activity   Alcohol Use Not Currently     Social History     Substance and Sexual Activity   Drug Use Never     Social History     Tobacco Use   Smoking Status Never Smoker   Smokeless Tobacco Never Used     Family History   Problem Relation Age of Onset    COPD Mother     Depression Mother     Stroke Father     Ovarian cancer Sister     Colon cancer Neg Hx     Colon polyps Neg Hx          Current Outpatient Medications:     amLODIPine (NORVASC) 5 mg tablet    Calcium-Cholecalciferol 500-200 MG-UNIT TABS    cholecalciferol (VITAMIN D3) 1,000 units tablet    cholestyramine (QUESTRAN) 4 g packet    diazepam (VALIUM) 5 mg tablet    hydroxychloroquine (PLAQUENIL) 200 mg tablet    lisinopril (ZESTRIL) 40 mg tablet    vitamin B-12 (VITAMIN B-12) 1,000 mcg tablet  Allergies   Allergen Reactions    Nitrofurantoin Fatigue     Pt  Unsure of reaction  Her primary has listed as an allergy  Other reaction(s): Unknown      Penicillins      Reviewed medications and allergies and updated as indicated    PHYSICAL EXAM:    Blood pressure 158/96, height 5' 5 5" (1 664 m), weight 78 5 kg (173 lb)  Body mass index is 28 35 kg/m²  General Appearance: NAD, cooperative, alert  Eyes: Anicteric, conjunctiva pink  ENT:  Normocephalic, atraumatic, normal mucosa  Neck:  Supple, symmetrical, trachea midline  Resp:  Clear to auscultation bilaterally; no rales, rhonchi or wheezing; respirations unlabored   CV:  S1 S2, Regular rate and rhythm; no murmur, rub, or gallop  GI:  Soft, non-tender, non-distended; normal bowel sounds; no masses, no organomegaly   Rectal: Deferred  Musculoskeletal: No cyanosis, clubbing , positive for trace edema Normal ROM  Skin:  No jaundice, rashes, or lesions   Heme/Lymph: No palpable cervical lymphadenopathy  Psych: Normal affect, good eye contact  Neuro: No gross deficits, AAOx3    Lab Results:   No results found for: WBC, HGB, HCT, MCV, PLT  Lab Results   Component Value Date    K 4 0 12/21/2021     12/21/2021    CO2 25 12/21/2021    BUN 18 12/21/2021    CREATININE 0 84 12/21/2021    CALCIUM 8 9 12/21/2021    AST 20 12/21/2021    ALT 17 12/21/2021    ALKPHOS 71 12/21/2021    EGFR 68 12/21/2021     CT of the chest and neck--air-filled esophagus without evidence of mediastinal air or pneumoperitoneum  -December 21, 2021 [place], [place]  2/16/2022 11:02 AM  Sign when Signing Visit  [facility] - Outpatient Follow-up Note  Cain Loera 78 y o  female MRN: [id number]  Encounter: [id number]    ASSESSMENT AND PLAN:      1  Esophageal dysphagia    --Patient with longstanding dysphagia for solids and pills  Happily did not have occur all that frequently but seemed to be escalating  Patient did have upper endoscopy in  December of 2021  Did have some subjective tightness in the proximal esophagus without clear-cut stricture but had empiric dilation  Developed some chest discomfort but negative ER workup for perforation   -  Since esophageal dilation doing better  Less concern with swallowing breads and solids  May have had 1 episode where she felt a pill "temporarily get stuck"    - continue to observe at this time  Will hold on barium swallow  If symptoms escalate would obtain a barium swallow    2  Sjogren's syndrome with keratoconjunctivitis sicca ([facility] [address] )    - likely contributing to problem 1  Encouraged to take plenty of fluids with food  3  Diarrhea, unspecified type  ---  Patient has had issues with loose stool since 2012  Has been on Questran with excellent results  -  Continues on Questran 1 pack per day  Very good results    4  Colon cancer screening  - last colonoscopy was January of last year 2021-- no colorectal polyps at that time or previous history  No further screening needed  5  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   ______________________________________________________________________    Chief Complaint   Patient presents with    Follow-up     feeling better     HPI: ***    Historical Information   Past Medical History:   Diagnosis Date    [facility] cancer ([facility] [address] )     right    Cancer ([facility] [address] [facility] )     right    Hypertension     [facility]     UTI (urinary tract infection)      Past Surgical History:   Procedure Laterality Date    BREAST LUMPECTOMY Right     CHOLECYSTECTOMY      COLONOSCOPY      JOINT REPLACEMENT Bilateral     knees    REPLACEMENT TOTAL KNEE Bilateral      Social History     Substance and Sexual Activity   Alcohol Use Not Currently     Social History     Substance and Sexual Activity   Drug Use Never     Social History     Tobacco Use   Smoking Status Never Smoker   Smokeless Tobacco Never Used     Family History   Problem Relation Age of Onset    COPD Mother     Depression Mother     Stroke Father     Ovarian cancer Sister     Colon cancer Neg Hx     Colon polyps Neg Hx          Current Outpatient Medications:     amLODIPine (NORVASC) 5 mg tablet    Calcium-Cholecalciferol 500-200 MG-UNIT TABS    cholecalciferol (VITAMIN D3) 1,000 units tablet    cholestyramine (QUESTRAN) 4 g packet    diazepam (VALIUM) 5 mg tablet    hydroxychloroquine (PLAQUENIL) 200 mg tablet    lisinopril (ZESTRIL) 40 mg tablet    vitamin B-12 (VITAMIN B-12) 1,000 mcg tablet  Allergies   Allergen Reactions    Nitrofurantoin Fatigue     Pt  Unsure of reaction  Her primary has listed as an allergy  Other reaction(s): Unknown      Penicillins      Reviewed medications and allergies and updated as indicated    PHYSICAL EXAM:    Blood pressure 158/96, height 5' 5 5" (1 664 m), weight 78 5 kg (173 lb)  Body mass index is 28 35 kg/m²  General Appearance: NAD, cooperative, alert  Eyes: Anicteric, PERRLA, EOMI  ENT:  Normocephalic, atraumatic, normal mucosa  Neck:  Supple, symmetrical, trachea midline  Resp:  Clear to auscultation bilaterally; no rales, rhonchi or wheezing; respirations unlabored   CV:  S1 S2, Regular rate and rhythm; no murmur, rub, or gallop    GI: Soft, non-tender, non-distended; normal bowel sounds; no masses, no organomegaly   Rectal: Deferred  Musculoskeletal: No cyanosis, clubbing or edema  Normal ROM  Skin:  No jaundice, rashes, or lesions   Heme/Lymph: No palpable cervical lymphadenopathy  Psych: Normal affect, good eye contact  Neuro: No gross deficits, AAOx3    Lab Results:   No results found for: WBC, HGB, HCT, MCV, PLT  Lab Results   Component Value Date    K 4 0 12/21/2021     12/21/2021    CO2 25 12/21/2021    BUN 18 12/21/2021    CREATININE 0 84 12/21/2021    CALCIUM 8 9 12/21/2021    AST 20 12/21/2021    ALT 17 12/21/2021    ALKPHOS 71 12/21/2021    EGFR 68 12/21/2021     No results found for: IRON, TIBC, FERRITIN  No results found for: LIPASE    Radiology Results:   No results found

## 2022-10-12 PROBLEM — Z12.11 COLON CANCER SCREENING: Status: RESOLVED | Noted: 2021-11-18 | Resolved: 2022-10-12

## 2022-10-12 PROBLEM — M35.01 SJOGREN'S SYNDROME WITH KERATOCONJUNCTIVITIS SICCA (HCC): Status: RESOLVED | Noted: 2020-12-31 | Resolved: 2022-10-12
